# Patient Record
(demographics unavailable — no encounter records)

---

## 2024-12-16 NOTE — PAST MEDICAL HISTORY
[Postmenopausal] : postmenopausal [Total Preg ___] : G[unfilled] [Live Births ___] : P[unfilled]  [Full Term ___] : Full Term: [unfilled] [Abortions ___] : Abortions:[unfilled] [AB Spont ___] : miscarriages: [unfilled]  [de-identified] : LMP 8 years ago

## 2024-12-16 NOTE — HEALTH RISK ASSESSMENT
[Very Good] : ~his/her~ current health as very good [Fair] :  ~his/her~ mood as fair [Yes] : Yes [2 - 3 times a week (3 pts)] : 2 - 3  times a week (3 points) [1 or 2 (0 pts)] : 1 or 2 (0 points) [Never (0 pts)] : Never (0 points) [No] : In the past 12 months have you used drugs other than those required for medical reasons? No [No falls in past year] : Patient reported no falls in the past year [0] : 2) Feeling down, depressed, or hopeless: Not at all (0) [PHQ-2 Negative - No further assessment needed] : PHQ-2 Negative - No further assessment needed [Patient reported PAP Smear was normal] : Patient reported PAP Smear was normal [HIV test declined] : HIV test declined [Hepatitis C test declined] : Hepatitis C test declined [With Significant Other] : lives with significant other [Unemployed] : unemployed [College] : College [] :  [Sexually Active] : sexually active [Feels Safe at Home] : Feels safe at home [Fully functional (bathing, dressing, toileting, transferring, walking, feeding)] : Fully functional (bathing, dressing, toileting, transferring, walking, feeding) [Fully functional (using the telephone, shopping, preparing meals, housekeeping, doing laundry, using] : Fully functional and needs no help or supervision to perform IADLs (using the telephone, shopping, preparing meals, housekeeping, doing laundry, using transportation, managing medications and managing finances) [With Patient/Caregiver] : , with patient/caregiver [Designated Healthcare Proxy] : Designated healthcare proxy [Name: ___] : Health Care Proxy's Name: [unfilled]  [Never] : Never [Patient declined mammogram] : Patient declined mammogram [Patient reported colonoscopy was normal] : Patient reported colonoscopy was normal [FreeTextEntry1] : Concern with weight [Audit-CScore] : 3 [[id number]] : 0 [Patient reported bone density results were normal] : Patient reported bone density results were normal [Change in mental status noted] : No change in mental status noted [Language] : denies difficulty with language [Behavior] : denies difficulty with behavior [Learning/Retaining New Information] : denies difficulty learning/retaining new information [Handling Complex Tasks] : denies difficulty handling complex tasks [Reasoning] : denies difficulty with reasoning [Spatial Ability and Orientation] : denies difficulty with spatial ability and orientation [High Risk Behavior] : no high risk behavior [MammogramComments] : b/l Mastectomy [PapSmearDate] : 10/24 [BoneDensityDate] : 2023 [ColonoscopyDate] : 10/23 [de-identified] : DEMETRIO [AdvancecareDate] : 12/24

## 2024-12-16 NOTE — HISTORY OF PRESENT ILLNESS
[FreeTextEntry1] : Establish Care [de-identified] : 63 year old female BAP1 gene monoallelic mutation, Breast Cancer s/p bilateral mastectomies dx 2010 Stage 1 Infiltrating ductal carcinoma ER P, CT +, HER2 -, HLD,  R hip pain, L knee pain associated with exercise.  Feels well today. Recently had relatives pass away from pancreatic cancer. 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

## 2024-12-16 NOTE — HEALTH RISK ASSESSMENT
[Very Good] : ~his/her~ current health as very good [Fair] :  ~his/her~ mood as fair [Yes] : Yes [2 - 3 times a week (3 pts)] : 2 - 3  times a week (3 points) [1 or 2 (0 pts)] : 1 or 2 (0 points) [Never (0 pts)] : Never (0 points) [No] : In the past 12 months have you used drugs other than those required for medical reasons? No [No falls in past year] : Patient reported no falls in the past year [0] : 2) Feeling down, depressed, or hopeless: Not at all (0) [PHQ-2 Negative - No further assessment needed] : PHQ-2 Negative - No further assessment needed [Patient reported PAP Smear was normal] : Patient reported PAP Smear was normal [HIV test declined] : HIV test declined [Hepatitis C test declined] : Hepatitis C test declined [With Significant Other] : lives with significant other [Unemployed] : unemployed [College] : College [] :  [Sexually Active] : sexually active [Feels Safe at Home] : Feels safe at home [Fully functional (bathing, dressing, toileting, transferring, walking, feeding)] : Fully functional (bathing, dressing, toileting, transferring, walking, feeding) [Fully functional (using the telephone, shopping, preparing meals, housekeeping, doing laundry, using] : Fully functional and needs no help or supervision to perform IADLs (using the telephone, shopping, preparing meals, housekeeping, doing laundry, using transportation, managing medications and managing finances) [With Patient/Caregiver] : , with patient/caregiver [Designated Healthcare Proxy] : Designated healthcare proxy [Name: ___] : Health Care Proxy's Name: [unfilled]  [Never] : Never [Patient declined mammogram] : Patient declined mammogram [Patient reported colonoscopy was normal] : Patient reported colonoscopy was normal [FreeTextEntry1] : Concern with weight [Audit-CScore] : 3 [[id number]] : 0 [Patient reported bone density results were normal] : Patient reported bone density results were normal [Change in mental status noted] : No change in mental status noted [Language] : denies difficulty with language [Behavior] : denies difficulty with behavior [Learning/Retaining New Information] : denies difficulty learning/retaining new information [Handling Complex Tasks] : denies difficulty handling complex tasks [Reasoning] : denies difficulty with reasoning [Spatial Ability and Orientation] : denies difficulty with spatial ability and orientation [High Risk Behavior] : no high risk behavior [MammogramComments] : b/l Mastectomy [PapSmearDate] : 10/24 [BoneDensityDate] : 2023 [ColonoscopyDate] : 10/23 [de-identified] : DEMETRIO [AdvancecareDate] : 12/24

## 2024-12-16 NOTE — ASSESSMENT
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 education provided.  Influenza Vaccine given today

## 2024-12-16 NOTE — HISTORY OF PRESENT ILLNESS
[FreeTextEntry1] : Establish Care [de-identified] : 63 year old female BAP1 gene monoallelic mutation, Breast Cancer s/p bilateral mastectomies dx 2010 Stage 1 Infiltrating ductal carcinoma ER P, [place] +, HER2 -, HLD,  R hip pain, L knee pain associated with exercise.  Feels well today. Recently had relatives pass away from pancreatic cancer. 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

## 2024-12-16 NOTE — PAST MEDICAL HISTORY
[Postmenopausal] : postmenopausal [Total Preg ___] : G[unfilled] [Live Births ___] : P[unfilled]  [Full Term ___] : Full Term: [unfilled] [Abortions ___] : Abortions:[unfilled] [AB Spont ___] : miscarriages: [unfilled]  [de-identified] : LMP 8 years ago

## 2024-12-16 NOTE — PHYSICAL EXAM
[No Acute Distress] : no acute distress [Well-Appearing] : well-appearing [Normal Sclera/Conjunctiva] : normal sclera/conjunctiva [PERRL] : pupils equal round and reactive to light [EOMI] : extraocular movements intact [No JVD] : no jugular venous distention [Supple] : supple [Thyroid Normal, No Nodules] : the thyroid was normal and there were no nodules present [No Respiratory Distress] : no respiratory distress  [Clear to Auscultation] : lungs were clear to auscultation bilaterally [Normal Rate] : normal rate  [Regular Rhythm] : with a regular rhythm [Normal S1, S2] : normal S1 and S2 [No Murmur] : no murmur heard [No Carotid Bruits] : no carotid bruits [No Edema] : there was no peripheral edema [Soft] : abdomen soft [Non Tender] : non-tender [Non-distended] : non-distended [No Masses] : no abdominal mass palpated [Normal Bowel Sounds] : normal bowel sounds [No CVA Tenderness] : no CVA  tenderness [No Spinal Tenderness] : no spinal tenderness [No Joint Swelling] : no joint swelling [Grossly Normal Strength/Tone] : grossly normal strength/tone [Coordination Grossly Intact] : coordination grossly intact [No Focal Deficits] : no focal deficits [Normal Gait] : normal gait [Normal Affect] : the affect was normal [Normal Insight/Judgement] : insight and judgment were intact

## 2025-05-19 NOTE — ASSESSMENT
[FreeTextEntry1] : The visit was provided via telehealth using real-time 2-way audio visual technology. The patient, Cherelle Tavera, was located at the medical office located in [place], [place] at the time of the visit. The Genetic Counselor, Kacie Sue, was located at the medical office located in [place], [place]. The physician, Dr. Renea Joiner, was located at the medical office in [place], [place]. The patient and both providers participated in the telehealth encounter. Consent for telehealth services was given on 2025 by the patient, Cherelle Tavera.   REASON FOR CONSULT Cherelle Tavera is a 64-year-old female who was referred by Dr. Ila Su for updated cancer genetic counseling and a discussion regarding her genetic testing results related to hereditary cancer predisposition.   RELEVANT MEDICAL HISTORY  Ms. Tavera was diagnosed with R-breast cancer at 49 years of age via 10/27/2010 R-breast stereotactic core biopsy which revealed a 4mm focus of well differentiated invasive ductal carcinoma. She proceeded to lumpectomy and SLNBx on 2010. Surgical pathology showed IDC/DCIS, ER+/KY+/Her2-. She then elected to pursue bilateral nipple-sparing mastectomies with implant reconstruction in 2011. Ms. Tavera reports she took tamoxifen for 1 year which was stopped due to presence of ovarian cysts. Ms. Tavera states she was previously being considered for Right-Salpingo-Oophorectomy due to the presence of these cysts, however, this was not pursed as she states these cysts resolved with cessation of tamoxifen use. Ms. Tavera denies undergoing any additional adjuvant therapy (RT/CT, etc.) She provided records from her care at [facility] and [facility] for our review which will be uploaded to her chart under "Outside Records".    She also has a family history of cancer.  Ms. Tavera pursued multiple hereditary cancer susceptibility tests since her diagnosis as detailed below: -	In  she underwent testing for mutations in the BRCA1, BRCA2 and PTEN genes at [facility]. Her results show that:  o	No mutations were detected by sequencing and by BRCA1 5-site rearrangement panel ([email or web address] #[id number]) o	No mutations were detected in the promoter region and in exons 1-9 of the PTEN gene ([facility] #[id number]) -	In  she underwent testing for mutations in 43 genes associated with hereditary cancer. Her results showed that: o	No clinically significant variants were detected ([facility] #[id number]) -	In  she underwent testing for mutations in 83 genes associated with hereditary cancer. Her results showed that: o	A Variant of Uncertain Significance, c.1663C>A ([id number]) was detected in her BAP1 gene ([facility] [id number])    OTHER MEDICAL AND SURGICAL HISTORY:  Medical: HLD, osteoarthritis, Vitamin D insufficiency Surgical:  x3, sinus surgery, tonsillectomy, L-SO due to a "solid mass" with reportedly benign frozen section (pathology not provided for review), eye surgery, knee replacement, "endometrial tissue in scars" which was "cleaned up" during breast reconstruction surgery   PAST OB/GYN HISTORY:  Obstetrical History:   Age at Menarche: 13  Menopausal with LMP at 55   Age at First Live Birth: 28  Oral Contraceptive Use: Yes, approx. 1 year in her early 20s  Hormone Replacement Therapy: None    CANCER SCREENING HISTORY:    Breast:  -	Mammo: none since annel mastect per Ms. Tavera -	Sono: none since annel mastect per Ms. Tavera -	Breast MRI: 2021; Results: BR1 Neg -	Breast Biopsies: Ms. Tavera reports solely 1 with 2010 diagnosis GYN: -	Most recent GYN annual exam:   -	Most recent pap smear: ; Results: reportedly normal -	Most recent transvaginal ultrasound: 2024 d/t pelvic pain; Results: No finding to explain the patient's pain. S/P left oophorectomy. Right ovary wnl, normal arterial and venous waveforms. Colon: -	Most recent colonoscopy: ; Results: no specimens collected; Frequency: 10 years per report -	Total Polyps: 0 polyps per Ms. Tavera Skin:   -	Most recent skin exam: 10/2024 -	Lesions biopsied/removed: Yes, Ms. Tavera recalls in years past having "precancerous" skin findings which were biopsied and/or required cryotherapy.  SOCIAL HISTORY:  - Tobacco-product use: Never smoker - Environmental exposure: None    FAMILY HISTORY:  Maternal ancestry was reported as Syriac, Croatian, and Estonian and paternal ancestry was reported as English, Samoan, and [place]. A detailed family history of cancer was ascertained, see below and scanned pedigree in chart.    Of note, Ms. Tavera reports that one paternal first cousin, also with early-onset breast cancer in her early 40s, pursued genetic testing which identified the same BAP1 variant as Ms. Tavera. A copy of this report is currently unavailable for our review.   To Ms. Tavera's knowledge, no one else in the family has had germline testing for cancer susceptibility.    	 RESULTS INTERPRETATION AND ASSESSMENT:  At this time the available evidence is insufficient to determine the role of this VUS in disease and the clinical significance of this result is uncertain. Individuals with a pathogenic mutation in BAP1 may have an increased risk for melanoma (uveal and cutaneous), malignant mesothelioma, renal cell carcinoma and basal cell carcinoma. It is unknown if the patient has an increased risk for the cancers associated with the BAP1 gene at this time.  Of note, [facility] classifies this BAP1 variant as Likely Benign while other labs such as [facility], [facility], and others classify it as VUS. Per discussion with [facility] clinical team member on 2025, they continue to classify this variant as a VUS and most recently observed this variant in 2025.     The detection of this VUS should not currently change the patient's medical management. It is NOT recommended at this time that family members use this result for predictive genetic testing or medical management decisions. With more research, a VUS may be reclassified as either disease-causing or benign. Ms. Tavera was encouraged to contact us every 2-3 years to enquire about any new information for this variant, or sooner if there are any changes in her personal or family history of cancer.  Such updates could possibly change our risk assessment and recommendations.    We also discussed that, while no clear cause of the patient's personal and family history of cancer was identified, this result does entirely not rule out a hereditary cancer risk in the patient. It is possible the patient has a mutation in one of the genes tested that is not detectable by this analysis, or has a mutation in a different gene, either known or unknown. It is also possible there is a hereditary cancer predisposition in the family, but the patient did not inherit it.   We also discussed that Ms. Tavera's personal and family history may be indicative of an inherited predisposition to breast cancer and possibly ovarian cancer, however, the genetic cause may be unknown at this time. Previous literature demonstrated there is an increased risk for breast cancer in the setting of multiple breast cancer diagnoses in a family, however, in the setting of multiple diagnoses of both breast and ovarian cancer in a family, there has been shown to be an increased risk for both breast and ovarian cancer.   However, details regarding her paternal cousin Julia's breast and ovarian cancer diagnoses, such as age, treatment and whether she has pursued genetic testing, is unclear. The patient was encouraged to discuss with her cousin about these diagnoses and genetic testing status. The patient was informed that this information is important in determining the appropriate management and screening plan for her and her family. We may reassess the risk for an inherited predisposition to ovarian cancer if further information regarding the family history becomes available in the future.  Given Ms. Tavera's personal and current reported family history of cancer, and her negative genetic test results, the following screening guidelines and risk-reducing recommendations were discussed:    BREAST:   - Ms. Hoangs personal and family history may be indicative of an inherited predisposition to breast cancer. - Ms. Tavera is s/p bilateral mastectomies following her 2010 R-breast cancer diagnosis. Therefore, long-term management and surveillance should be based on Ms. Tavera's post-treatment protocol as recommended by her breast/oncology teams.   GYN: - Ovarian cancer screening with transvaginal ultrasound and/or serum  testing has not been shown to reduce ovarian cancer mortality.  - Given her reported family history of breast cancer and ovarian cancer, we discussed with Ms. Tavera that it may not be unreasonable for her to considering risk-reducing Right-salpingo-oophorectomy (RRSO) for ovarian cancer risk-reduction. Please note, she reports she is s/p L-SO due to a benign "solid mass". - We discussed with Ms. Tavera that if she would be interested in further considering RRSO that she should discuss all of the above options in greater detail with a GYN oncologist. She was informed we remain available to provide her with the contact information for local GYN Oncologists. - We also advised Ms. Tavera that prior to further surgical consideration, she may desire to clarify whether her paternal cousin Julia who had early-onset breast and ovarian cancer has pursued genetic testing as this would allow us to reassess the risk for an inherited predisposition to ovarian cancer. She was informed that if this cousin has not had genetic testing, then we would be happy to see her cousin and coordinate this. We reviewed that should the patient's cousin Julia pursue testing and should this identify a detectable cause for the patient's additional family history of breast cancer and/or ovarian cancer that the patient herself may not be at increased risk for ovarian cancer.  - Ms. Tavera states she prefers to first contact her cousin to clarify her cousin's genetic testing status prior to further surgical consideration and she will recontact us with any additional information she receives or if she is not able to learn any further details.  PANCREAS: - At this time, there is no proven effective screening for pancreatic cancer, though the National Comprehensive Cancer Network (NCCN) states that investigational screening may be considered for individuals who have a family history of the disease in >=1 first-degree and >=1 second-degree relative from the same side of the family, even in the absence of a known pathogenic/likely pathogenic germline variant.  - We discussed that, although she does not meet criteria for pancreatic screening currently, Ms. Tavera may consider discussion of the pros/cons of pancreatic cancer screening given a maternal aunt/maternal grandfather had pancreatic cancer, and she elected to defer referral to the Pancreas team at this time. - Given Ms. Tavera's current reported family history, screening is not recommended at this time. She was however informed that criteria for participating in investigational pancreatic cancer screening may change in the future, and she was encouraged to re-contact the Cancer Genetics team every 2-3 years to discuss any possible changes in screening recommendations.  OTHER:   - In the absence of other indications, Ms. Tavera should practice age-appropriate cancer screening of other organ systems as recommended for the general population.    IMPLICATIONS FOR FAMILY MEMBERS Given Ms. Tavera's personal and family history of early-onset breast cancer, we recommended her daughter and sisters pursue individualized breast cancer risk assessment and increased breast cancer risk screening with annual mammogram and annual MRI at their current ages given the earliest reported breast cancer in the family is in the early 40s. We will reassess any ovarian cancer-related management or risk reducing recommendations pending Ms. Tavera obtaining additional information from her paternal cousin related to the reported ovarian cancer diagnosis.   In addition, we recommended Ms. Tavera's affected paternal first cousins pursue cancer risk assessment genetic counseling with the option of genetic testing due to their history of early-onset breast cancer and ovarian cancer. Her maternal relatives (mother, aunt, cousins, siblings) could also pursue cancer risk assessment genetic counseling with the option of genetic testing due to the maternal family history of breast and pancreatic cancers.   PLAN:  1.	See above for recommended management.  2.	Ms. Tavera was encouraged to contact us if she acquires additional information regarding her relatives' history of cancer/genetic testing status as this may allow us to better assess her risk for an inherited cancer predisposition. 2.	Testing of family members based on this BAP1 VUS is not recommended.   3.	The patient was encouraged to contact us every 2-3 years to check on any changes in interpretation of the VUS, or sooner if there are changes in her personal or family history of cancer.  4.	Patient informed consult note(s) will be available through their [facility] patient portal.       For any additional questions please call Cancer Genetics at [phone].       Kacie Sue MS, [facility] [place]  Genetic Counselor, Cancer Genetics     Attending Attestation:   I have reviewed and edited the genetic counselor's note and I agree with the assessment and plan as documented. I spent approximately 60 minutes in total time of which approximately 45 minutes was face-to-face (via 2-way audiovisual telemedicine connection) with Ms. TAVERA reviewing her relevant personal and family history, the genetic testing results, our risk-assessment, and options for future cancer risk-reduction for the patient and her relevant family members. Over half this time was spent in counseling and coordination of care.   Renea Joiner, DO Cancer Genetics [facility]         CC:   Patient  Dr. Ila Su

## 2025-05-19 NOTE — ASSESSMENT
[FreeTextEntry1] : The visit was provided via telehealth using real-time 2-way audio visual technology. The patient, Cherelle Tavera, was located at the medical office located in [place], [place] at the time of the visit. The Genetic Counselor, Kacie Sue, was located at the medical office located in [place], [place]. The physician, Dr. Renea Joiner, was located at the medical office in [place], [place]. The patient and both providers participated in the telehealth encounter. Consent for telehealth services was given on 2025 by the patient, Cherelle Tavera.   REASON FOR CONSULT Cherelle Tavera is a 64-year-old female who was referred by Dr. Ila Su for updated cancer genetic counseling and a discussion regarding her genetic testing results related to hereditary cancer predisposition.   RELEVANT MEDICAL HISTORY  Ms. Tavera was diagnosed with R-breast cancer at 49 years of age via 10/27/2010 R-breast stereotactic core biopsy which revealed a 4mm focus of well differentiated invasive ductal carcinoma. She proceeded to lumpectomy and SLNBx on 2010. Surgical pathology showed IDC/DCIS, ER+/AR+/Her2-. She then elected to pursue bilateral nipple-sparing mastectomies with implant reconstruction in 2011. Ms. Tavera reports she took tamoxifen for 1 year which was stopped due to presence of ovarian cysts. Ms. Tavera states she was previously being considered for Right-Salpingo-Oophorectomy due to the presence of these cysts, however, this was not pursed as she states these cysts resolved with cessation of tamoxifen use. Ms. Tavera denies undergoing any additional adjuvant therapy (RT/CT, etc.) She provided records from her care at [facility] and [facility] for our review which will be uploaded to her chart under "Outside Records".    She also has a family history of cancer.  Ms. Tavera pursued multiple hereditary cancer susceptibility tests since her diagnosis as detailed below: -	In  she underwent testing for mutations in the BRCA1, BRCA2 and PTEN genes at [facility]. Her results show that:  o	No mutations were detected by sequencing and by BRCA1 5-site rearrangement panel ([facility] #[id number]) o	No mutations were detected in the promoter region and in exons 1-9 of the PTEN gene ([facility] #[id number]) -	In  she underwent testing for mutations in 43 genes associated with hereditary cancer. Her results showed that: o	No clinically significant variants were detected ([facility] #[id number]) -	In  she underwent testing for mutations in 83 genes associated with hereditary cancer. 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 1 year in her early 20s  Hormone Replacement Therapy: None    CANCER SCREENING HISTORY:    Breast:  -	Mammo: none since annel mastect per Ms. Tavera -	Sono: none since annel mastect per Ms. Tavera -	Breast MRI: 2021; Results: BR1 Neg -	Breast Biopsies: Ms. Tavera reports solely 1 with 2010 diagnosis GYN: -	Most recent GYN annual exam:   -	Most recent pap smear: ; Results: reportedly normal -	Most recent transvaginal ultrasound: 2024 d/t pelvic pain; Results: No finding to explain the patient's pain. S/P left oophorectomy. Right ovary wnl, normal arterial and venous waveforms. Colon: -	Most recent colonoscopy: ; Results: no specimens collected; Frequency: 10 years per report -	Total Polyps: 0 polyps per Ms. Tavera Skin:   -	Most recent skin exam: 10/2024 -	Lesions biopsied/removed: Yes, Ms. Tavera recalls in years past having "precancerous" skin findings which were biopsied and/or required cryotherapy.  SOCIAL HISTORY:  - Tobacco-product use: Never smoker - Environmental exposure: None    FAMILY HISTORY:  Maternal ancestry was reported as Romanian, Telugu, and Citizen of [place] and paternal ancestry was reported as English, [place]i, and [place]. A detailed family history of cancer was ascertained, see below and scanned pedigree in chart.    Of note, Ms. Tavera reports that one paternal first cousin, also with early-onset breast cancer in her early 40s, pursued genetic testing which identified the same BAP1 variant as Ms. Tavera. A copy of this report is currently unavailable for our review.   To Ms. Tavera's knowledge, no one else in the family has had germline testing for cancer susceptibility.    	 RESULTS INTERPRETATION AND ASSESSMENT:  At this time the available evidence is insufficient to determine the role of this VUS in disease and the clinical significance of this result is uncertain. Individuals with a pathogenic mutation in BAP1 may have an increased risk for melanoma (uveal and cutaneous), malignant mesothelioma, renal cell carcinoma and basal cell carcinoma. It is unknown if the patient has an increased risk for the cancers associated with the BAP1 gene at this time.  Of note, [facility] classifies this BAP1 variant as Likely Benign while other labs such as [facility], [facility], and others classify it as VUS. Per discussion with [facility] clinical team member on 2025, they continue to classify this variant as a VUS and most recently observed this variant in 2025.     The detection of this VUS should not currently change the patient's medical management. It is NOT recommended at this time that family members use this result for predictive genetic testing or medical management decisions. With more research, a VUS may be reclassified as either disease-causing or benign. Ms. Tavera was encouraged to contact us every 2-3 years to enquire about any new information for this variant, or sooner if there are any changes in her personal or family history of cancer.  Such updates could possibly change our risk assessment and recommendations.    We also discussed that, while no clear cause of the patient's personal and family history of cancer was identified, this result does entirely not rule out a hereditary cancer risk in the patient. It is possible the patient has a mutation in one of the genes tested that is not detectable by this analysis, or has a mutation in a different gene, either known or unknown. It is also possible there is a hereditary cancer predisposition in the family, but the patient did not inherit it.   We also discussed that Ms. Tavera's personal and family history may be indicative of an inherited predisposition to breast cancer and possibly ovarian cancer, however, the genetic cause may be unknown at this time. Previous literature demonstrated there is an increased risk for breast cancer in the setting of multiple breast cancer diagnoses in a family, however, in the setting of multiple diagnoses of both breast and ovarian cancer in a family, there has been shown to be an increased risk for both breast and ovarian cancer.   However, details regarding her paternal cousin Julia's breast and ovarian cancer diagnoses, such as age, treatment and whether she has pursued genetic testing, is unclear. The patient was encouraged to discuss with her cousin about these diagnoses and genetic testing status. The patient was informed that this information is important in determining the appropriate management and screening plan for her and her family. We may reassess the risk for an inherited predisposition to ovarian cancer if further information regarding the family history becomes available in the future.  Given Ms. Tavera's personal and current reported family history of cancer, and her negative genetic test results, the following screening guidelines and risk-reducing recommendations were discussed:    BREAST:   - Ms. Hoangs personal and family history may be indicative of an inherited predisposition to breast cancer. - Ms. Tavera is s/p bilateral mastectomies following her 2010 R-breast cancer diagnosis. Therefore, long-term management and surveillance should be based on Ms. Tavera's post-treatment protocol as recommended by her breast/oncology teams.   GYN: - Ovarian cancer screening with transvaginal ultrasound and/or serum  testing has not been shown to reduce ovarian cancer mortality.  - Given her reported family history of breast cancer and ovarian cancer, we discussed with Ms. Tavera that it may not be unreasonable for her to considering risk-reducing Right-salpingo-oophorectomy (RRSO) for ovarian cancer risk-reduction. Please note, she reports she is s/p L-SO due to a benign "solid mass". - We discussed with Ms. Tavera that if she would be interested in further considering RRSO that she should discuss all of the above options in greater detail with a GYN oncologist. She was informed we remain available to provide her with the contact information for local GYN Oncologists. - We also advised Ms. Tavera that prior to further surgical consideration, she may desire to clarify whether her paternal cousin Julia who had early-onset breast and ovarian cancer has pursued genetic testing as this would allow us to reassess the risk for an inherited predisposition to ovarian cancer. She was informed that if this cousin has not had genetic testing, then we would be happy to see her cousin and coordinate this. We reviewed that should the patient's cousin Julia pursue testing and should this identify a detectable cause for the patient's additional family history of breast cancer and/or ovarian cancer that the patient herself may not be at increased risk for ovarian cancer.  - Ms. Tavera states she prefers to first contact her cousin to clarify her cousin's genetic testing status prior to further surgical consideration and she will recontact us with any additional information she receives or if she is not able to learn any further details.  PANCREAS: - At this time, there is no proven effective screening for pancreatic cancer, though the National Comprehensive Cancer Network (NCCN) states that investigational screening may be considered for individuals who have a family history of the disease in >=1 first-degree and >=1 second-degree relative from the same side of the family, even in the absence of a known pathogenic/likely pathogenic germline variant.  - We discussed that, although she does not meet criteria for pancreatic screening currently, Ms. Tavera may consider discussion of the pros/cons of pancreatic cancer screening given a maternal aunt/maternal grandfather had pancreatic cancer, and she elected to defer referral to the Pancreas team at this time. - Given Ms. Tavera's current reported family history, screening is not recommended at this time. She was however informed that criteria for participating in investigational pancreatic cancer screening may change in the future, and she was encouraged to re-contact the Cancer Genetics team every 2-3 years to discuss any possible changes in screening recommendations.  OTHER:   - In the absence of other indications, Ms. Tavera should practice age-appropriate cancer screening of other organ systems as recommended for the general population.    IMPLICATIONS FOR FAMILY MEMBERS Given Ms. Tavera's personal and family history of early-onset breast cancer, we recommended her daughter and sisters pursue individualized breast cancer risk assessment and increased breast cancer risk screening with annual mammogram and annual MRI at their current ages given the earliest reported breast cancer in the family is in the early 40s. We will reassess any ovarian cancer-related management or risk reducing recommendations pending Ms. Tavera obtaining additional information from her paternal cousin related to the reported ovarian cancer diagnosis.   In addition, we recommended Ms. Tavera's affected paternal first cousins pursue cancer risk assessment genetic counseling with the option of genetic testing due to their history of early-onset breast cancer and ovarian cancer. Her maternal relatives (mother, aunt, cousins, siblings) could also pursue cancer risk assessment genetic counseling with the option of genetic testing due to the maternal family history of breast and pancreatic cancers.   PLAN:  1.	See above for recommended management.  2.	Ms. Tavera was encouraged to contact us if she acquires additional information regarding her relatives' history of cancer/genetic testing status as this may allow us to better assess her risk for an inherited cancer predisposition. 2.	Testing of family members based on this BAP1 VUS is not recommended.   3.	The patient was encouraged to contact us every 2-3 years to check on any changes in interpretation of the VUS, or sooner if there are changes in her personal or family history of cancer.  4.	Patient informed consult note(s) will be available through their [facility] patient portal.       For any additional questions please call Cancer Genetics at [phone].       Kacie Sue MS, [facility]  Genetic Counselor, Cancer Genetics     Attending Attestation:   I have reviewed and edited the genetic counselor's note and I agree with the assessment and plan as documented. I spent approximately 60 minutes in total time of which approximately 45 minutes was face-to-face (via 2-way audiovisual telemedicine connection) with Ms. TAVERA reviewing her relevant personal and family history, the genetic testing results, our risk-assessment, and options for future cancer risk-reduction for the patient and her relevant family members. Over half this time was spent in counseling and coordination of care.   Renea Joiner, DO Cancer Genetics [facility]         CC:   Patient  Dr. Ila Su